# Patient Record
Sex: FEMALE | Race: OTHER | HISPANIC OR LATINO
[De-identification: names, ages, dates, MRNs, and addresses within clinical notes are randomized per-mention and may not be internally consistent; named-entity substitution may affect disease eponyms.]

---

## 2022-02-25 PROBLEM — Z00.00 ENCOUNTER FOR PREVENTIVE HEALTH EXAMINATION: Status: ACTIVE | Noted: 2022-02-25

## 2022-02-28 ENCOUNTER — APPOINTMENT (OUTPATIENT)
Dept: OBGYN | Facility: CLINIC | Age: 72
End: 2022-02-28
Payer: MEDICARE

## 2022-02-28 VITALS
HEART RATE: 88 BPM | OXYGEN SATURATION: 97 % | HEIGHT: 59 IN | SYSTOLIC BLOOD PRESSURE: 152 MMHG | WEIGHT: 193 LBS | DIASTOLIC BLOOD PRESSURE: 91 MMHG | BODY MASS INDEX: 38.91 KG/M2

## 2022-02-28 PROCEDURE — 99387 INIT PM E/M NEW PAT 65+ YRS: CPT

## 2022-02-28 NOTE — PHYSICAL EXAM
[Examination Of The Breasts] : a normal appearance [No Masses] : no breast masses were palpable [Labia Majora] : normal [Labia Minora] : normal [Normal] : normal [Atrophy] : atrophy [Rectocele] : a rectocele [Absent] : absent [Uterine Adnexae] : normal

## 2022-02-28 NOTE — HISTORY OF PRESENT ILLNESS
[FreeTextEntry1] : here for annual\par no complaints \par mammo 2021 neg per patient\par menopausal \par hx JUAN RAMON BS for AUB-L \par

## 2023-06-29 ENCOUNTER — APPOINTMENT (OUTPATIENT)
Dept: SURGERY | Facility: CLINIC | Age: 73
End: 2023-06-29
Payer: MEDICARE

## 2023-06-29 VITALS
HEIGHT: 59 IN | BODY MASS INDEX: 38.3 KG/M2 | WEIGHT: 190 LBS | SYSTOLIC BLOOD PRESSURE: 148 MMHG | DIASTOLIC BLOOD PRESSURE: 89 MMHG

## 2023-06-29 DIAGNOSIS — Z80.3 FAMILY HISTORY OF MALIGNANT NEOPLASM OF BREAST: ICD-10-CM

## 2023-06-29 DIAGNOSIS — Z82.49 FAMILY HISTORY OF ISCHEMIC HEART DISEASE AND OTHER DISEASES OF THE CIRCULATORY SYSTEM: ICD-10-CM

## 2023-06-29 DIAGNOSIS — Z83.3 FAMILY HISTORY OF DIABETES MELLITUS: ICD-10-CM

## 2023-06-29 DIAGNOSIS — I10 ESSENTIAL (PRIMARY) HYPERTENSION: ICD-10-CM

## 2023-06-29 DIAGNOSIS — Z84.89 FAMILY HISTORY OF OTHER SPECIFIED CONDITIONS: ICD-10-CM

## 2023-06-29 DIAGNOSIS — H26.9 UNSPECIFIED CATARACT: ICD-10-CM

## 2023-06-29 PROCEDURE — 99203 OFFICE O/P NEW LOW 30 MIN: CPT

## 2023-06-29 NOTE — PHYSICAL EXAM
[Alert] : alert [Oriented to Person] : oriented to person [Oriented to Place] : oriented to place [Oriented to Time] : oriented to time [Calm] : calm [de-identified] : She  is alert, well-groomed, and in NAD\par   [de-identified] : anicteric.  Nasal mucosa pink, septum midline. Oral mucosa pink.  Tongue midline, Pharynx without exudates.\par   [de-identified] : Neck supple. Trachea midline. Thyroid isthmus barely palpable, lobes not felt.\par   [de-identified] : BL inverted nipple for many years.  induration right breast at biopsy site. No chest deformity. Breast are symmetric, Normal contours. No nodules, masses, tenderness, or axillary or supraclavicular  adenopathy. No nipple discharge. no skin retraction

## 2023-06-29 NOTE — PLAN
[FreeTextEntry1] : Ms. BRADFORD  was told significance of findings, options, risks and benefits were explained.  Informed consent for excision right breast mass with spot localization and potential risks, benefits and alternatives (surgical options were discussed including non-surgical options or the option of no surgery) to the planned surgery were discussed in depth.  All surgical options were discussed including non-surgical treatments.  She wishes to proceed with surgery.  We will plan for surgery on at the next available date, pending any required insurance pre-certification or pre-approval. She agrees to obtain any necessary pre-operative evaluations and testing prior to surgery.\par Patient advised to seek immediate medical attention with any acute change in symptoms or with the development of any new or worsening symptoms.  Patient's questions and concerns addressed to patient's satisfaction, and patient verbalized an understanding of the information discussed.\par \par

## 2023-06-29 NOTE — HISTORY OF PRESENT ILLNESS
[de-identified] : Patient is a 72 year   -old female  who was referred by Dr. Mele Cota with the chief complaint of having a Right  breast mass. She  denies any trauma and She has no nipple discharge. She  denies any fever, night sweats or loss of appetite.    There is  family history of breast carcinoma in her mother in her 60s and her daughter at age 48 ( she had chemo and RT.  genetic testing was negative).    Menarche at age 12 -7 para-7  and her last menstrual period was before her hysterectomy at age 49. Patient is on no hormonal replacement therapy.    Patient  had a BL breast US and a  mammogram on 05/15/2023 that was deemed a BIRADS 0. \par She had a targeted right breast mammogram  on 2023 that  was deemed a BIRADS4. \par A  stereotactic biopsy right breast was done on 2023  .  Pathology results were consistent with  Breast tissue with fibrocystic changes with calcium oxalate crystals. Hemangioma. Incidental small intraductal papilloma. concordance is not documented

## 2023-06-29 NOTE — DATA REVIEWED
[FreeTextEntry1] : 	\par Exam requested by:\par DR ROCK JORDAN MD\par [address]\par [place] [address]\par SITE PERFORMED: [facility]\par SITE PHONE: [phone]\par Patient: ARCHIE BRADFORD\par YOB: 1950\par Phone: [phone]\par MRN: [id number] Acc: [id number]\par Date of Exam: 06-\par  \par Addendum 1 - 06-\par  \par BREAST SURGICAL PATHOLOGY ADDENDUM:\par \par Right central/retroareolar calcifications: Breast tissue with fibrocystic changes with calcium oxalate crystals. Hemangioma. Incidental small intraductal papilloma.\par \par Please see official pathology report for details.\par \par Given the presence of incidental small intraductal papilloma consultation with a surgeon is recommended for further management.\par \par A right mammogram with magnification views is also recommended in 6 months.\par \par Thank you for the opportunity to participate in the care of this patient.  \par  \par Radha Bustillo MD  - Electronically Signed: 06- 2:30 PM \par Physician to Physician Direct Line is: [phone]\par Original Report\par EXAM: STEREOTACTIC BIOPSY 1 SITE\par \par HISTORY: Stereotactic biopsy of right breast calcifications.\par \par COMPARISON: Prior breast imaging studies dated 6/5/2023\par \par PROCEDURE: The procedure was explained to the patient, including benefits and alternatives. The risks, including but not limited to infection and bleeding, were reviewed. All questions were answered, and the patient agreed to the procedure, signing the consent form. Universal timeout was performed.\par \par A  film and subsequent prefire tomosynthesis imaging shows the suspicious calcifications within the operating window. \par \par Using sterile technique, 5 mL of 1% lidocaine and 10 mL of 1% lidocaine with epinephrine was administered. Biopsy was performed using a 9-gauge EVIVA vacuum-assisted breast biopsy standard needle utilizing a lateral-medial approach. Confirmatory pre--fire images demonstrate adequate placement of the biopsy needle. The usual number of core samples was obtained. A specimen radiograph confirms the presence of calcifications.\par \par A cork shaped Eviva biopsy clip was deployed at the biopsy site. \par \par A postprocedure mammogram demonstrates appropriate placement of the clip. \par \par The patient tolerated the procedure well without complications. The patient was given postbiopsy care instructions. The specimen was subsequently sent to the pathology lab.\par \par IMPRESSION: 1 site stereotactic biopsy was performed. \par \par Pathology results and concordance will follow as an addendum to this report. \par \par Thank you for the opportunity to participate in the care of this patient.  \par  \par Radha Bustillo MD  - Electronically Signed: 06- 3:39 PM \par Physician to Physician Direct Line is: [phone]\par

## 2023-06-29 NOTE — CONSULT LETTER
[Dear  ___] : Dear  [unfilled], [Consult Letter:] : I had the pleasure of evaluating your patient, [unfilled]. [Please see my note below.] : Please see my note below. [Consult Closing:] : Thank you very much for allowing me to participate in the care of this patient.  If you have any questions, please do not hesitate to contact me. [Sincerely,] : Sincerely, [FreeTextEntry3] : Cayetano Hobbs MD, FACS

## 2023-08-02 ENCOUNTER — OUTPATIENT (OUTPATIENT)
Dept: OUTPATIENT SERVICES | Facility: HOSPITAL | Age: 73
LOS: 1 days | End: 2023-08-02

## 2023-08-02 VITALS
DIASTOLIC BLOOD PRESSURE: 89 MMHG | HEART RATE: 62 BPM | WEIGHT: 190.04 LBS | RESPIRATION RATE: 16 BRPM | TEMPERATURE: 98 F | SYSTOLIC BLOOD PRESSURE: 160 MMHG | OXYGEN SATURATION: 98 % | HEIGHT: 59 IN

## 2023-08-02 DIAGNOSIS — Z90.710 ACQUIRED ABSENCE OF BOTH CERVIX AND UTERUS: Chronic | ICD-10-CM

## 2023-08-02 DIAGNOSIS — N63.10 UNSPECIFIED LUMP IN THE RIGHT BREAST, UNSPECIFIED QUADRANT: ICD-10-CM

## 2023-08-02 DIAGNOSIS — Z98.49 CATARACT EXTRACTION STATUS, UNSPECIFIED EYE: Chronic | ICD-10-CM

## 2023-08-02 DIAGNOSIS — I10 ESSENTIAL (PRIMARY) HYPERTENSION: ICD-10-CM

## 2023-08-02 DIAGNOSIS — Z01.818 ENCOUNTER FOR OTHER PREPROCEDURAL EXAMINATION: ICD-10-CM

## 2023-08-02 RX ORDER — SODIUM CHLORIDE 9 MG/ML
3 INJECTION INTRAMUSCULAR; INTRAVENOUS; SUBCUTANEOUS EVERY 8 HOURS
Refills: 0 | Status: DISCONTINUED | OUTPATIENT
Start: 2023-08-09 | End: 2023-08-23

## 2023-08-02 NOTE — H&P PST ADULT - PROBLEM SELECTOR PLAN 2
Pt instructed  to continue all antihypertensives as prescribed including taking on day of surgery with small sip of water.

## 2023-08-02 NOTE — H&P PST ADULT - HISTORY OF PRESENT ILLNESS
72 y.o female with, PMHx for HTN, Hysterectomy 2008, cataract surgery. Found to have Right Breast Mass   on routine Mammogram 5/2023, s/p Right Breast Biopsy revealed incidental findings of Intraductal Papilloma.   now presents for presurgical evaluation for schedule Excision of Right Breast Mass with Pre-Operative Needle Localization Rad Time

## 2023-08-02 NOTE — H&P PST ADULT - NSICDXFAMILYHX_GEN_ALL_CORE_FT
FAMILY HISTORY:  Mother  Still living? Unknown  FH: breast cancer, Age at diagnosis: Age Unknown    Child  Still living? Yes, Estimated age: Age Unknown  FH: breast cancer, Age at diagnosis: Age Unknown

## 2023-08-02 NOTE — H&P PST ADULT - ASSESSMENT
72 y.o female with, Unspecified Lump in the Right Breast and now for schedule Excision of Right Breast Mass with Pre-Operative   Needle Localization Rad Time on 8/9/23 with Dr Anjel DONOHUE 3

## 2023-08-02 NOTE — H&P PST ADULT - NSICDXPROCEDURE_GEN_ALL_CORE_FT
PROCEDURES:  Excision, lesion, breast, with needle localization 02-Aug-2023 15:38:37  Rachele Ontiveros

## 2023-08-02 NOTE — H&P PST ADULT - PROBLEM SELECTOR PLAN 1
Pt schedule for Excision of Right Breast Mass with Pre-Operative Needle Localization Rad Time on 8/9/23 with Dr Hobbs     Labs and EKG done  by PCP 7/26/23- will f/u result  Pt was seen by PCP for Medical clearance - will f/u report    Pt was  instructed to stop aspirin/ecotrin and all over the counter medication including vitamins and herbal supplements one week prior to surgery   Instructions given on the use of 4% chlorhexidine wash and Pt verbalized understanding of same   Pt Instructed to have nothing by mouth starting midnight day before surgery  Patient is to expect a phone call day before surgery between the hours of 430- 630pm giving arrival time for surgery   Written and verbal preoperative instructions given to patient with understanding verbalized via # [id number]  Shadi    Patient today with STOP bang score 3  Low  risk for ALAN

## 2023-08-08 ENCOUNTER — TRANSCRIPTION ENCOUNTER (OUTPATIENT)
Age: 73
End: 2023-08-08

## 2023-08-09 ENCOUNTER — TRANSCRIPTION ENCOUNTER (OUTPATIENT)
Age: 73
End: 2023-08-09

## 2023-08-09 ENCOUNTER — RESULT REVIEW (OUTPATIENT)
Age: 73
End: 2023-08-09

## 2023-08-09 ENCOUNTER — OUTPATIENT (OUTPATIENT)
Dept: OUTPATIENT SERVICES | Facility: HOSPITAL | Age: 73
LOS: 1 days | End: 2023-08-09
Payer: COMMERCIAL

## 2023-08-09 ENCOUNTER — APPOINTMENT (OUTPATIENT)
Dept: SURGERY | Facility: HOSPITAL | Age: 73
End: 2023-08-09
Payer: MEDICARE

## 2023-08-09 VITALS
TEMPERATURE: 98 F | OXYGEN SATURATION: 100 % | HEART RATE: 68 BPM | SYSTOLIC BLOOD PRESSURE: 127 MMHG | RESPIRATION RATE: 16 BRPM | DIASTOLIC BLOOD PRESSURE: 65 MMHG

## 2023-08-09 VITALS
HEART RATE: 61 BPM | WEIGHT: 190.04 LBS | RESPIRATION RATE: 16 BRPM | TEMPERATURE: 98 F | HEIGHT: 59 IN | OXYGEN SATURATION: 95 % | DIASTOLIC BLOOD PRESSURE: 84 MMHG | SYSTOLIC BLOOD PRESSURE: 164 MMHG

## 2023-08-09 DIAGNOSIS — Z90.710 ACQUIRED ABSENCE OF BOTH CERVIX AND UTERUS: Chronic | ICD-10-CM

## 2023-08-09 DIAGNOSIS — Z98.49 CATARACT EXTRACTION STATUS, UNSPECIFIED EYE: Chronic | ICD-10-CM

## 2023-08-09 DIAGNOSIS — N63.10 UNSPECIFIED LUMP IN THE RIGHT BREAST, UNSPECIFIED QUADRANT: ICD-10-CM

## 2023-08-09 PROCEDURE — 76098 X-RAY EXAM SURGICAL SPECIMEN: CPT

## 2023-08-09 PROCEDURE — 76098 X-RAY EXAM SURGICAL SPECIMEN: CPT | Mod: 26

## 2023-08-09 PROCEDURE — 19125 EXCISION BREAST LESION: CPT | Mod: RT

## 2023-08-09 PROCEDURE — 19281 PERQ DEVICE BREAST 1ST IMAG: CPT

## 2023-08-09 PROCEDURE — 88307 TISSUE EXAM BY PATHOLOGIST: CPT

## 2023-08-09 PROCEDURE — 19281 PERQ DEVICE BREAST 1ST IMAG: CPT | Mod: RT

## 2023-08-09 PROCEDURE — 88307 TISSUE EXAM BY PATHOLOGIST: CPT | Mod: 26

## 2023-08-09 RX ORDER — SODIUM CHLORIDE 9 MG/ML
1000 INJECTION, SOLUTION INTRAVENOUS
Refills: 0 | Status: DISCONTINUED | OUTPATIENT
Start: 2023-08-09 | End: 2023-08-09

## 2023-08-09 RX ORDER — ONDANSETRON 8 MG/1
4 TABLET, FILM COATED ORAL ONCE
Refills: 0 | Status: DISCONTINUED | OUTPATIENT
Start: 2023-08-09 | End: 2023-08-09

## 2023-08-09 RX ORDER — HYDROMORPHONE HYDROCHLORIDE 2 MG/ML
0.5 INJECTION INTRAMUSCULAR; INTRAVENOUS; SUBCUTANEOUS
Refills: 0 | Status: DISCONTINUED | OUTPATIENT
Start: 2023-08-09 | End: 2023-08-09

## 2023-08-09 RX ORDER — HYDROMORPHONE HYDROCHLORIDE 2 MG/ML
1 INJECTION INTRAMUSCULAR; INTRAVENOUS; SUBCUTANEOUS
Refills: 0 | Status: DISCONTINUED | OUTPATIENT
Start: 2023-08-09 | End: 2023-08-09

## 2023-08-09 RX ADMIN — SODIUM CHLORIDE 3 MILLILITER(S): 9 INJECTION INTRAMUSCULAR; INTRAVENOUS; SUBCUTANEOUS at 07:52

## 2023-08-09 NOTE — ASU DISCHARGE PLAN (ADULT/PEDIATRIC) - CARE PROVIDER_API CALL
Cayetano Hobbs  Surgery  [address]  [place] [address]  Phone: [phone]  Fax: [phone]  Follow Up Time: 2 weeks

## 2023-08-09 NOTE — BRIEF OPERATIVE NOTE - NSICDXBRIEFPROCEDURE_GEN_ALL_CORE_FT
PROCEDURES:  Excision, mass, breast, with needle localization 09-Aug-2023 11:39:39 right Ana Maria Dominguez

## 2023-08-09 NOTE — ASU DISCHARGE PLAN (ADULT/PEDIATRIC) - PATIENT BELONGINGS
"Requested Prescriptions   Pending Prescriptions Disp Refills     acyclovir (ZOVIRAX) 400 MG tablet [Pharmacy Med Name: ACYCLOVIR 400 MG TABLET] 21 tablet      Sig: Take 1 tablet (400 mg) by mouth 3 times daily Uses only for outbreaks    Antivirals for Herpes Protocol Failed    1/30/2018  5:11 PM       Failed - Recent or future visit with authorizing provider's specialty    Patient had office visit in the last year or has a visit in the next 30 days with authorizing provider.  See \"Patient Info\" tab in inbasket, or \"Choose Columns\" in Meds & Orders section of the refill encounter.     Last Written Prescription Date:  9/15/17  Last Fill Quantity: 21,  # refills: 0   Last Office Visit with [facility] provider:  3/22/16   Future Office Visit:    Next 5 appointments (look out 90 days)     Feb 06, 2018  3:40 PM CST   [facility] Jennifer [facility]-C   [facility]    [address] [facility] [address]   [phone]            Mar 06, 2018  3:40 PM CST   Return Visit with Jennifer Hinton PA-C   [facility]    [address] [facility] [address]   [phone]                            Passed - Patient is age 12 or older       Passed - Normal serum creatinine on file in past 12 months    Recent Labs   Lab Test  01/09/18   1517   CR  0.65               " Patient's belongings returned

## 2023-08-09 NOTE — ASU PATIENT PROFILE, ADULT - PATIENT/CAREGIVER ACCEPTED INTERPRETER SERVICES
COLPOSCOPY PROCEDURE:  2022    Site: Colposcopy of the cervix and upper vagina    23: ASC-H pap smear  Age 23: NILM (per patient report)  Age 22: ASCUS (per patient report)    The colposcopy procedure was explained in detail. Benefits, alternatives including doing nothing, and risks including pain, bleeding, infection, uterine perforation, damage to surrounding organs, possible disruption of pregnancy, possibly missing abnormal tissue and need to repeat procedure were reviewed. Patient's written informed consent for procedure and time out was performed. Urine pregnancy test negative. Speculum inserted. Cervix examined.  No abnormal vessels were noted.  The cervix was then liberally coated with acetic acid solution and examined using the colposcope at multiple gilbert and with the white filter lights.  The entire transformation zone was visualized.  She had coarse mosacism in the setting of acetowhite epithelium extended from 9 o'clock to 3 o'clock on the anterior cervix. All edges of the lesion were visualized, borders were geographic.  Biopsies were obtained from 3 o'clock and 11 o'clock.  A small amount of bleeding was noted at the biopsy sites and was controlled with Monsels solution and silver nitrate.  Excellent hemostasis noted.  All instruments were removed.  Sponge and instrument counts correct X 2. Complications: none.     ASSESSMENT:  Mirta Bautista 24 y.o.  presents for colposcopy secondary to ASC-H pap.  Patient desires to undergo colposcopy today.     We discussed the classification of abnormal pap smears as well as ROGER in detail. Etiology of ROGER 2, 3 secondary to HPV discussed with patient, normally due to subtypes 16, 18. We discussed HPV in detail. We discussed how it is transmitted, how it causes cervical dysplasia, and risk factors for persistence and progression. The procedure was discussed with the patient and she is aware that she might experience some post procedural  "cramping.  She is aware that she may take Motrin as needed for pain.       Discussed etiology of HPV, that it is sexually transmitted and approximately 80% of individuals have been exposed to it at some point in their lives. Discussed that it is called the "common cold" of the cervix. Smoking and age affect the body's ability to clear this virus.   Discussed that the % chance of high grade lesions for ASCUS is 10-15%, ASC-H 40%, LSIL 25%, HSIL 60%. It takes approximately 7-10 years for these changes to progress to cancer.     IMPRESSION: ROGER 1-2    PLAN:  Office will call patient with results and follow up will be made as appropriate per ASCCP guidelines.   Motrin as need for pain.  Pt was educated regarding the possibility of spotting or brown or yellow discharge post procedure.  She was told to put nothing in the vagina for 7 days, including tampons, and to abstain from intercourse for the next 7 days.  She is aware that she should call the office immediately for any heavy bleeding, fevers, pain not controlled by Motrin or any other concerns    Alessandro Henao  8/4/2022    " yes

## 2023-08-09 NOTE — ASU DISCHARGE PLAN (ADULT/PEDIATRIC) - PROCEDURE
Excision of right reast mass with preop needle localization Excision of right breast mass with preop needle localization

## 2023-08-11 LAB — SURGICAL PATHOLOGY STUDY: SIGNIFICANT CHANGE UP

## 2023-08-16 ENCOUNTER — NON-APPOINTMENT (OUTPATIENT)
Age: 73
End: 2023-08-16

## 2023-08-16 PROBLEM — I10 ESSENTIAL (PRIMARY) HYPERTENSION: Chronic | Status: ACTIVE | Noted: 2023-08-02

## 2023-08-16 PROBLEM — N63.10 UNSPECIFIED LUMP IN THE RIGHT BREAST, UNSPECIFIED QUADRANT: Chronic | Status: ACTIVE | Noted: 2023-08-02

## 2023-08-24 ENCOUNTER — APPOINTMENT (OUTPATIENT)
Age: 73
End: 2023-08-24
Payer: MEDICARE

## 2023-08-24 PROCEDURE — 99024 POSTOP FOLLOW-UP VISIT: CPT

## 2023-08-24 NOTE — PHYSICAL EXAM
[Alert] : alert [Oriented to Person] : oriented to person [Oriented to Place] : oriented to place [Oriented to Time] : oriented to time [Calm] : calm [de-identified] : She  is alert, well-groomed, and in NAD\par    [de-identified] : anicteric.  Nasal mucosa pink, septum midline. Oral mucosa pink.  Tongue midline, Pharynx without exudates.\par    [de-identified] : Neck supple. Trachea midline. Thyroid isthmus barely palpable, lobes not felt.\par    [de-identified] : right breast Surgical wound is healing well.   no signs of  inflammation or infection.

## 2023-08-24 NOTE — HISTORY OF PRESENT ILLNESS
[de-identified] : Ms. BRADFORD  is s/p excision right breast mass with spot localization on 2023. Patient's pathology results were  consistent with Fibrocystic changes, proliferative type, without atypia, including usual duct epithelial hyperplasia and small duct papillomas. intraductal calcification associated with hyperplasia. Today  Ms. BRADFORD offers no complaints. patient reports no fever or  chills. patient reports occasional discomfort in the surgical area.  Her surgical incisions are healing well. No signs of inflammation, infection or exudate. patient reports good bowel movements and appetite.  PERTINENT HISTORY:  There is family history of breast carcinoma in her mother in her 60s and her daughter at age 48 ( she had chemo and RT. genetic testing was negative). Menarche at age 12 -7 para-7 and her last menstrual period was before her hysterectomy at age 49.   Patient had a BL breast US and a mammogram on 05/15/2023 that was deemed a BIRADS 0.She had a targeted right breast mammogram on 2023 that was deemed a BIRADS4. A stereotactic biopsy right breast was done on 2023. Pathology results were consistent with Breast tissue with fibrocystic changes with calcium oxalate crystals. Hemangioma. Incidental small intraductal papilloma. concordance is not documented

## 2023-08-24 NOTE — DATA REVIEWED
[FreeTextEntry1] : Rose[facility] Accession Number : [id number] Patient:   ARCHIE BRADFORD   Accession:                             [id number]  Collected Date/Time:                   8/9/2023 11:27 EDT Received Date/Time:                    8/9/2023 11:34 EDT  Surgical Pathology Report - Auth (Verified)  Specimen(s) Submitted Right breast mass  Final Diagnosis Breast, right, mass; wire-localization excision: - Fibrocystic changes, proliferative type, without atypia, including usual duct epithelial hyperplasia and small duct papillomas. - Intraductal calcification associated with hyperplasia.  Verified by: CLEMENTINE CORDERO M.D. (Electronic Signature) Reported on: 08/11/23 17:48 EDT, [address] Phone: [phone]   Fax: [phone] _________________________________________________________________  Clinical Information  Right breast mass

## 2023-08-24 NOTE — ASSESSMENT
[FreeTextEntry1] : Ms. BRADFORD is doing well, with excellent post-operative recovery. The surgical incision is healing well and as expected. There is no evidence of infection or complication, and patient is progressing as expected. Post-operative wound care, activity, restrictions and precautions reinforced.  Pathology results were discussed in details. Patient's questions and concerns addressed to patient's satisfaction.

## 2023-11-20 ENCOUNTER — NON-APPOINTMENT (OUTPATIENT)
Age: 73
End: 2023-11-20

## 2023-11-30 ENCOUNTER — APPOINTMENT (OUTPATIENT)
Dept: SURGERY | Facility: CLINIC | Age: 73
End: 2023-11-30

## 2023-12-14 ENCOUNTER — APPOINTMENT (OUTPATIENT)
Dept: SURGERY | Facility: CLINIC | Age: 73
End: 2023-12-14
Payer: MEDICARE

## 2023-12-14 VITALS
SYSTOLIC BLOOD PRESSURE: 126 MMHG | DIASTOLIC BLOOD PRESSURE: 76 MMHG | HEART RATE: 70 BPM | WEIGHT: 190 LBS | HEIGHT: 59 IN | BODY MASS INDEX: 38.3 KG/M2

## 2023-12-14 PROCEDURE — 99213 OFFICE O/P EST LOW 20 MIN: CPT

## 2023-12-14 NOTE — PLAN
[FreeTextEntry1] : Ms. BRADFORD  is presenting  today for an evaluation .  she is doing well and offers no complaints.     She  was advised to have   right     breast Mammogram and return after the test in 3 months. . Importance of monthly self-breast examination was reinforced.  Patient's questions and concerns addressed to patient's satisfaction.

## 2023-12-14 NOTE — PHYSICAL EXAM
[Alert] : alert [Oriented to Person] : oriented to person [Oriented to Place] : oriented to place [Oriented to Time] : oriented to time [Calm] : calm [de-identified] : She  is alert, well-groomed, and in NAD\par    [de-identified] : anicteric.  Nasal mucosa pink, septum midline. Oral mucosa pink.  Tongue midline, Pharynx without exudates.\par    [de-identified] : Neck supple. Trachea midline. Thyroid isthmus barely palpable, lobes not felt.\par    [de-identified] : right breast Surgical wound healed well.    Breast are symmetric,  No palpable nodules, masses, tenderness, or axillary or supraclavicular adenopathy. No nipple discharge. no skin retraction

## 2023-12-14 NOTE — HISTORY OF PRESENT ILLNESS
[de-identified] : This is  a 73 year   old patient presenting today for a breast exam .  Ms. BRADFORD  is s/p excision right breast mass with spot localization on 2023. Patient's pathology results were  consistent with Fibrocystic changes, proliferative type, without atypia, including usual duct epithelial hyperplasia and small duct papillomas. intraductal calcification associated with hyperplasia. Patient reports no interval changes to her overall health status or medical history   Ms. BRADFORD denies any trauma to the breast, denies  skin changes  and   she has no spontaneous nipple discharge. Patient denies any fever, night sweats or loss of appetite.    she has a prescription for Right breast mammo from Dr Cota in January.  PERTINENT HISTORY:  There is family history of breast carcinoma in her mother in her 60s and her daughter at age 48 ( she had chemo and RT. genetic testing was negative). Menarche at age 12 -7 para-7 and her last menstrual period was before her hysterectomy at age 49.   Patient had a BL breast US and a mammogram on 05/15/2023 that was deemed a BIRADS 0.She had a targeted right breast mammogram on 2023 that was deemed a BIRADS4. A stereotactic biopsy right breast was done on 2023. Pathology results were consistent with Breast tissue with fibrocystic changes with calcium oxalate crystals. Hemangioma. Incidental small intraductal papilloma. concordance is not documented

## 2023-12-14 NOTE — ASSESSMENT
[FreeTextEntry1] : Impression:  right breast mass  Ms. BRADFORD is doing well, with excellent post-operative recovery. All surgical incisions  healed well and as expected. There is no evidence of  complication or recurrence, and she is progressing as expected.   Patient's questions and concerns addressed to patient's satisfaction.

## 2024-03-04 ENCOUNTER — APPOINTMENT (OUTPATIENT)
Dept: OBGYN | Facility: CLINIC | Age: 74
End: 2024-03-04
Payer: MEDICARE

## 2024-03-04 VITALS
OXYGEN SATURATION: 98 % | BODY MASS INDEX: 38.98 KG/M2 | WEIGHT: 193 LBS | HEART RATE: 89 BPM | DIASTOLIC BLOOD PRESSURE: 77 MMHG | SYSTOLIC BLOOD PRESSURE: 134 MMHG

## 2024-03-04 DIAGNOSIS — Z01.419 ENCOUNTER FOR GYNECOLOGICAL EXAMINATION (GENERAL) (ROUTINE) W/OUT ABNORMAL FINDINGS: ICD-10-CM

## 2024-03-04 PROCEDURE — 99397 PER PM REEVAL EST PAT 65+ YR: CPT

## 2024-03-05 PROBLEM — Z01.419 WOMEN'S ANNUAL ROUTINE GYNECOLOGICAL EXAMINATION: Status: ACTIVE | Noted: 2022-02-28

## 2024-03-07 ENCOUNTER — APPOINTMENT (OUTPATIENT)
Dept: SURGERY | Facility: CLINIC | Age: 74
End: 2024-03-07
Payer: MEDICARE

## 2024-03-07 VITALS
SYSTOLIC BLOOD PRESSURE: 152 MMHG | WEIGHT: 192 LBS | HEIGHT: 59 IN | BODY MASS INDEX: 38.71 KG/M2 | DIASTOLIC BLOOD PRESSURE: 78 MMHG | HEART RATE: 67 BPM | OXYGEN SATURATION: 96 %

## 2024-03-07 PROCEDURE — 99213 OFFICE O/P EST LOW 20 MIN: CPT

## 2024-03-07 NOTE — DATA REVIEWED
[FreeTextEntry1] : [facility] [address] [facility] [address] S[facility] PERFORMED: [facility] Patient: ARCHIE BRADFORD YOB: 1950 Phone: [phone] MRN: [id number] Acc: [id number] Date of Exam: 01-   Addendum 1 - 01-   The pathology results from the stereotactic core needle biopsy of the right breast performed 1/23/2024 reveal columnar cell change with associated calcifications. Dilated ducts with prominent infoldings. A portion of dilated ducts with prominent frond-like infoldings is noted in one of the cores. This may cause partial luminal obstruction with a subsequent duct dilation. Intraductal papilloma cannot entirely be excluded. Surgical consultation is recommended. Attempt was made to contact the patient at 12:57 PM on 1/29/2024. Attempt was also made to contact the ordering provider at 1:00 PM on 1/29/2024. Critical results pathway initiated.  Thank you for the opportunity to participate in the care of this patient.     KENDRA RAMÍREZ MD  - Electronically Signed: 01- 3:20 PM  Physician to Physician Direct Line is:  Original Report EXAM: STEREOTACTIC BIOPSY 1 SITE  HISTORY: The patient is 73 years old and was referred for a stereotactic biopsy of calcifications at the right 3 o'clock central axis.  COMPARISON: Prior breast imaging studies dated 1/9/2024, 6/12/2023, 6/5/2023.  PROCEDURE: The procedure was explained to the patient, including benefits and alternatives. The risks, including but not limited to infection and bleeding, were reviewed. All questions were answered, and the patient agreed to the procedure, signing the consent form. Universal timeout was performed.  A  film and subsequent prefire tomosynthesis imaging shows the suspicious calcifications within the operating window.   Using sterile technique, 8 mL of 2% lidocaine buffered with sodium bicarbonate solution was used for local and superficial anesthesia and 10 mL of 2% lidocaine with epinephrine buffered with sodium bicarbonate solution was administered to anesthetize the deeper tissues. A skin incision was made prior to insertion of the biopsy needle. Biopsy was performed using a 9-gauge EVIVA vacuum-assisted breast biopsy standard needle utilizing a medial-lateral approach. Confirmatory pre-and post-fire images demonstrate adequate placement of the biopsy needle. The usual number of core samples was obtained. A specimen radiograph confirms the presence of calcifications.  A cork shaped Eviva biopsy clip was deployed at the biopsy site. Residual calcifications were present post core biopsy.  A postprocedure mammogram demonstrates appropriate placement of the clip.   The patient tolerated the procedure well without complications. The patient was given postbiopsy care instructions. The specimen was subsequently sent to the pathology lab.  IMPRESSION: 1 site stereotactic biopsy was performed.   Pathology results and concordance will follow as an addendum to this report.   Thank you for the opportunity to participate in the care of this patient.     KENDRA RAMÍREZ MD  - Electronically Signed: 01- 1:27 PM  Physician to Physician Direct Line is:

## 2024-03-07 NOTE — PHYSICAL EXAM
[Oriented to Person] : oriented to person [Alert] : alert [Oriented to Place] : oriented to place [Oriented to Time] : oriented to time [Calm] : calm [de-identified] : She  is alert, well-groomed, and in NAD\par    [de-identified] : anicteric.  Nasal mucosa pink, septum midline. Oral mucosa pink.  Tongue midline, Pharynx without exudates.\par    [de-identified] : Neck supple. Trachea midline. Thyroid isthmus barely palpable, lobes not felt.\par    [de-identified] : right breast  well  healed scar.    Breast are symmetric,  No palpable nodules, masses, tenderness, or axillary or supraclavicular adenopathy. No nipple discharge. no skin retraction

## 2024-03-07 NOTE — HISTORY OF PRESENT ILLNESS
[de-identified] : This is  a 73 year   old patient presenting today for a breast exam and to discuss the results of her recent  breast imaging and biopsy.  Patient had a  right breast   Mammogram on 2024. .  Deemed BIRADS category  4.   Ms. BRADFORD  is s/p BL breast US on 2024  that was deemed BIRADS 2.  Ms. BRADFORD  is s/p Stereotactic biopsy right breast on 2024. Patient's pathology results were  consistent with columnar cell change with associated calcifications. Dilated ducts with prominent infoldings. A portion of dilated ducts with prominent frond-like infoldings is noted in one of the cores. This may cause partial luminal obstruction with a subsequent duct dilation. Intraductal papilloma cannot entirely be excluded. Surgical consultation is recommended. Patient reports no interval changes to her overall health status or medical history.   Ms. BRADFORD denies any trauma to the breast, denies  skin changes  and   she has no spontaneous nipple discharge. Patient denies any fever, night sweats or loss of appetite.     PERTINENT HISTORY: There is family history of breast carcinoma in her mother in her 60s and her daughter at age 48 ( she had chemo and RT. genetic testing was negative). Menarche at age 12 -7 para-7 and her last menstrual period was before her hysterectomy at age 49. Patient had a BL breast US and a mammogram on 05/15/2023 that was deemed a BIRADS 0.She had a targeted right breast mammogram on 2023 that was deemed a BIRADS4. A stereotactic biopsy right breast was done on 2023. Pathology results were consistent with Breast tissue with fibrocystic changes with calcium oxalate crystals. Hemangioma. Incidental small intraductal papilloma. concordance is not documented. Ms. BRADFORD is s/p excision right breast mass with spot localization on 2023. Patient's pathology results were consistent with Fibrocystic changes, proliferative type, without atypia, including usual duct epithelial hyperplasia and small duct papillomas. intraductal calcification associated with hyperplasia.

## 2024-03-07 NOTE — PLAN
[FreeTextEntry1] : Ms. BRADFORD  was told significance of findings, options, risks and benefits were explained.  Informed consent for excision right breast mass with spot localization , and potential risks, benefits and alternatives (surgical options were discussed including non-surgical options or the option of no surgery) to the planned surgery were discussed in depth.  All surgical options were discussed including non-surgical treatments.  She wishes to proceed with surgery.  We will plan for surgery on at the next available date, pending any required insurance pre-certification or pre-approval. She agrees to obtain any necessary pre-operative evaluations and testing prior to surgery. Patient advised to seek immediate medical attention with any acute change in symptoms or with the development of any new or worsening symptoms.  Patient's questions and concerns addressed to patient's satisfaction, and patient verbalized an understanding of the information discussed.

## 2024-03-14 ENCOUNTER — APPOINTMENT (OUTPATIENT)
Dept: SURGERY | Facility: CLINIC | Age: 74
End: 2024-03-14

## 2024-03-21 ENCOUNTER — OUTPATIENT (OUTPATIENT)
Dept: OUTPATIENT SERVICES | Facility: HOSPITAL | Age: 74
LOS: 1 days | End: 2024-03-21
Payer: COMMERCIAL

## 2024-03-21 VITALS
OXYGEN SATURATION: 97 % | SYSTOLIC BLOOD PRESSURE: 148 MMHG | HEIGHT: 59 IN | RESPIRATION RATE: 18 BRPM | WEIGHT: 190.04 LBS | TEMPERATURE: 98 F | DIASTOLIC BLOOD PRESSURE: 97 MMHG | HEART RATE: 62 BPM

## 2024-03-21 DIAGNOSIS — N18.9 CHRONIC KIDNEY DISEASE, UNSPECIFIED: ICD-10-CM

## 2024-03-21 DIAGNOSIS — J98.9 RESPIRATORY DISORDER, UNSPECIFIED: ICD-10-CM

## 2024-03-21 DIAGNOSIS — K76.0 FATTY (CHANGE OF) LIVER, NOT ELSEWHERE CLASSIFIED: ICD-10-CM

## 2024-03-21 DIAGNOSIS — I10 ESSENTIAL (PRIMARY) HYPERTENSION: ICD-10-CM

## 2024-03-21 DIAGNOSIS — Z01.818 ENCOUNTER FOR OTHER PREPROCEDURAL EXAMINATION: ICD-10-CM

## 2024-03-21 DIAGNOSIS — N63.10 UNSPECIFIED LUMP IN THE RIGHT BREAST, UNSPECIFIED QUADRANT: ICD-10-CM

## 2024-03-21 DIAGNOSIS — Z90.710 ACQUIRED ABSENCE OF BOTH CERVIX AND UTERUS: Chronic | ICD-10-CM

## 2024-03-21 DIAGNOSIS — Z98.49 CATARACT EXTRACTION STATUS, UNSPECIFIED EYE: Chronic | ICD-10-CM

## 2024-03-21 RX ORDER — AMLODIPINE BESYLATE 2.5 MG/1
1 TABLET ORAL
Refills: 0 | DISCHARGE

## 2024-03-21 NOTE — H&P PST ADULT - PROBLEM SELECTOR PLAN 2
Instructed to continue antihypertensive meds and take them with sips of water on day of surgery.  Optimized for surgery by PCP.  Follow-up with PCP for management.

## 2024-03-21 NOTE — H&P PST ADULT - RESPIRATORY AND THORAX COMMENTS
COVID-19 virus infection in 2021 x 2-fully recovered, COVID-19 virus infection in 2021 x 2-fully recovered, mild prominent interstitial markings as of CXR of 3/12/2024-being followed by Mele Cipriano

## 2024-03-21 NOTE — H&P PST ADULT - PROBLEM SELECTOR PLAN 1
Pt for excision of right breast mass with preop needle localization on 4/2/2024.  ALAN stop bang score 4. Pt denies history of ALAN, never did sleep study.  Preoperative instructions discussed with pt via Serbian speaking [employer]  Apryl ID# [id number]. [place] copy of instructions given to pt.   Instructed pt that he will need someone to escort him home after surgery, to notify security when he arrives in the lobby of the hospital that he is here for surgery, not to eat or drink anything after midnight the night before the surgery, to avoid NSAIDs such as Ibuprofen, Motrin, aleve, Advil, naproxen before surgery, to take Tylenol if needed for pain, to report if he has been exposed to any one with any contagious diseases including Covid-19 or if he is exhibiting any symptoms of COVID-19.  Instructed about use of Chlorhexidine 4% soap 3 days before surgery including the morning of surgery. Verbalized understanding of instructions given.

## 2024-03-21 NOTE — H&P PST ADULT - PROBLEM SELECTOR PLAN 3
mild prominent interstitial markings as per recent chest X-Ray of 3/12/24-followed by pulmonology Dr Cota.  Will obtain last visit note with pulmonology.

## 2024-03-21 NOTE — H&P PST ADULT - HISTORY OF PRESENT ILLNESS
This is a 73 year old female with PMH of COVID-19 virus infection in 2021 x 2-fully recovered, HTN, CKD, presents with c/o right breast mass. Pt for excision of right breast mass with preop needle localization on 4/2/2024. This is a 73 year old  female with PMH of COVID-19 virus infection in 2021 x 2-fully recovered, HTN, CKDLAST egfr 58 on 3/12/24-being followed by nephrology, nonalcoholic fatty liver, mild prominent interstitial markings as per recent chest X-Ray of 3/12/24-followed by pulmonology Dr Cota, who presents with c/o right breast mass. Pt for excision of right breast mass with preop needle localization on 4/2/2024.

## 2024-03-21 NOTE — H&P PST ADULT - PROBLEM SELECTOR PLAN 4
Last EGFR 58 on 3/12/24-being followed by nephrology.  NSAIDs to be avoided perioperatively.  Pt instructed to avoid NSAIDs 1 week before surgery.  Advised to take Tylenol as needed for pain.   Stated understanding of instructions given.

## 2024-03-21 NOTE — H&P PST ADULT - ASSESSMENT
This is a 73 year old  female with PMH of COVID-19 virus infection in 2021 x 2-fully recovered, HTN, CKD-last EGFR 58-being followed by nephrology, nonalcoholic fatty liver, mild prominent interstitial markings as per recent chest X-Ray of 3/12/24-followed by pulmonology Dr Cota, who presents with right breast mass. Pt for excision of right breast mass with preop needle localization on 4/2/2024.  ALAN stop bang score 4. Pt denies history of ALAN, never did sleep study.

## 2024-03-21 NOTE — H&P PST ADULT - NSICDXPROCEDURE_GEN_ALL_CORE_FT
PROCEDURES:  Excision of right breast mass with needle localization 21-Mar-2024 19:42:52  Fay Bradshaw

## 2024-03-22 PROCEDURE — G0463: CPT

## 2024-03-26 ENCOUNTER — TRANSCRIPTION ENCOUNTER (OUTPATIENT)
Age: 74
End: 2024-03-26

## 2024-03-27 ENCOUNTER — APPOINTMENT (OUTPATIENT)
Dept: SURGERY | Facility: HOSPITAL | Age: 74
End: 2024-03-27

## 2024-03-27 ENCOUNTER — RESULT REVIEW (OUTPATIENT)
Age: 74
End: 2024-03-27

## 2024-03-27 ENCOUNTER — TRANSCRIPTION ENCOUNTER (OUTPATIENT)
Age: 74
End: 2024-03-27

## 2024-03-27 ENCOUNTER — OUTPATIENT (OUTPATIENT)
Dept: OUTPATIENT SERVICES | Facility: HOSPITAL | Age: 74
LOS: 1 days | End: 2024-03-27
Payer: COMMERCIAL

## 2024-03-27 VITALS
TEMPERATURE: 98 F | WEIGHT: 190.04 LBS | SYSTOLIC BLOOD PRESSURE: 148 MMHG | HEART RATE: 58 BPM | OXYGEN SATURATION: 97 % | DIASTOLIC BLOOD PRESSURE: 84 MMHG | RESPIRATION RATE: 17 BRPM | HEIGHT: 59 IN

## 2024-03-27 VITALS
TEMPERATURE: 98 F | DIASTOLIC BLOOD PRESSURE: 79 MMHG | HEART RATE: 68 BPM | SYSTOLIC BLOOD PRESSURE: 155 MMHG | RESPIRATION RATE: 16 BRPM | OXYGEN SATURATION: 95 %

## 2024-03-27 DIAGNOSIS — N63.10 UNSPECIFIED LUMP IN THE RIGHT BREAST, UNSPECIFIED QUADRANT: ICD-10-CM

## 2024-03-27 DIAGNOSIS — Z98.49 CATARACT EXTRACTION STATUS, UNSPECIFIED EYE: Chronic | ICD-10-CM

## 2024-03-27 DIAGNOSIS — Z90.710 ACQUIRED ABSENCE OF BOTH CERVIX AND UTERUS: Chronic | ICD-10-CM

## 2024-03-27 DIAGNOSIS — Z01.818 ENCOUNTER FOR OTHER PREPROCEDURAL EXAMINATION: ICD-10-CM

## 2024-03-27 PROCEDURE — 88342 IMHCHEM/IMCYTCHM 1ST ANTB: CPT

## 2024-03-27 PROCEDURE — 19281 PERQ DEVICE BREAST 1ST IMAG: CPT | Mod: RT

## 2024-03-27 PROCEDURE — 88305 TISSUE EXAM BY PATHOLOGIST: CPT | Mod: 26

## 2024-03-27 PROCEDURE — 88341 IMHCHEM/IMCYTCHM EA ADD ANTB: CPT

## 2024-03-27 PROCEDURE — 88341 IMHCHEM/IMCYTCHM EA ADD ANTB: CPT | Mod: 26

## 2024-03-27 PROCEDURE — 19125 EXCISION BREAST LESION: CPT | Mod: RT

## 2024-03-27 PROCEDURE — 88305 TISSUE EXAM BY PATHOLOGIST: CPT

## 2024-03-27 PROCEDURE — 19281 PERQ DEVICE BREAST 1ST IMAG: CPT

## 2024-03-27 PROCEDURE — 88342 IMHCHEM/IMCYTCHM 1ST ANTB: CPT | Mod: 26

## 2024-03-27 PROCEDURE — 76098 X-RAY EXAM SURGICAL SPECIMEN: CPT

## 2024-03-27 PROCEDURE — 76098 X-RAY EXAM SURGICAL SPECIMEN: CPT | Mod: 26

## 2024-03-27 RX ORDER — SODIUM CHLORIDE 9 MG/ML
3 INJECTION INTRAMUSCULAR; INTRAVENOUS; SUBCUTANEOUS EVERY 8 HOURS
Refills: 0 | Status: DISCONTINUED | OUTPATIENT
Start: 2024-03-27 | End: 2024-03-27

## 2024-03-27 RX ORDER — HYDROMORPHONE HYDROCHLORIDE 2 MG/ML
0.5 INJECTION INTRAMUSCULAR; INTRAVENOUS; SUBCUTANEOUS
Refills: 0 | Status: DISCONTINUED | OUTPATIENT
Start: 2024-03-27 | End: 2024-03-27

## 2024-03-27 RX ORDER — AMLODIPINE BESYLATE 2.5 MG/1
1 TABLET ORAL
Refills: 0 | DISCHARGE

## 2024-03-27 RX ORDER — HYDROMORPHONE HYDROCHLORIDE 2 MG/ML
1 INJECTION INTRAMUSCULAR; INTRAVENOUS; SUBCUTANEOUS
Refills: 0 | Status: DISCONTINUED | OUTPATIENT
Start: 2024-03-27 | End: 2024-03-27

## 2024-03-27 RX ORDER — OXYCODONE AND ACETAMINOPHEN 5; 325 MG/1; MG/1
1 TABLET ORAL ONCE
Refills: 0 | Status: DISCONTINUED | OUTPATIENT
Start: 2024-03-27 | End: 2024-03-27

## 2024-03-27 RX ORDER — CHLORTHALIDONE 50 MG
1 TABLET ORAL
Refills: 0 | DISCHARGE

## 2024-03-27 RX ORDER — ONDANSETRON 8 MG/1
4 TABLET, FILM COATED ORAL ONCE
Refills: 0 | Status: DISCONTINUED | OUTPATIENT
Start: 2024-03-27 | End: 2024-03-27

## 2024-03-27 RX ORDER — OLMESARTAN MEDOXOMIL 5 MG/1
1 TABLET, FILM COATED ORAL
Refills: 0 | DISCHARGE

## 2024-03-27 RX ORDER — HYDRALAZINE HCL 50 MG
1 TABLET ORAL
Refills: 0 | DISCHARGE

## 2024-03-27 RX ORDER — SODIUM CHLORIDE 9 MG/ML
1000 INJECTION, SOLUTION INTRAVENOUS
Refills: 0 | Status: DISCONTINUED | OUTPATIENT
Start: 2024-03-27 | End: 2024-03-27

## 2024-03-27 RX ORDER — LABETALOL HCL 100 MG
1 TABLET ORAL
Refills: 0 | DISCHARGE

## 2024-03-27 RX ORDER — OXYCODONE HYDROCHLORIDE 5 MG/1
1 TABLET ORAL
Qty: 4 | Refills: 0
Start: 2024-03-27

## 2024-03-27 RX ORDER — ERGOCALCIFEROL 1.25 MG/1
1 CAPSULE ORAL
Refills: 0 | DISCHARGE

## 2024-03-27 RX ORDER — ASPIRIN/CALCIUM CARB/MAGNESIUM 324 MG
1 TABLET ORAL
Refills: 0 | DISCHARGE

## 2024-03-27 NOTE — ASU PATIENT PROFILE, ADULT - FALL HARM RISK - UNIVERSAL INTERVENTIONS
Bed in lowest position, wheels locked, appropriate side rails in place/Call bell, personal items and telephone in reach/Instruct patient to call for assistance before getting out of bed or chair/Non-slip footwear when patient is out of bed/Traer to call system/Physically safe environment - no spills, clutter or unnecessary equipment/Purposeful Proactive Rounding/Room/bathroom lighting operational, light cord in reach

## 2024-03-27 NOTE — ASU PATIENT PROFILE, ADULT - NSICDXPASTMEDICALHX_GEN_ALL_CORE_FT
PAST MEDICAL HISTORY:  Chronic kidney disease (CKD)     HTN (hypertension)     Mass of right breast     Prediabetes     Respiratory disorder

## 2024-03-27 NOTE — ASU DISCHARGE PLAN (ADULT/PEDIATRIC) - CARE PROVIDER_API CALL
Cayetano Hobbs  Surgery  [address] [facility], [address]  [place] [address]  Phone: [phone]  Fax: [phone]  Follow Up Time: 2 weeks

## 2024-03-27 NOTE — BRIEF OPERATIVE NOTE - NSICDXBRIEFPROCEDURE_GEN_ALL_CORE_FT
PROCEDURES:  Lumpectomy or partial mastectomy, after needle localization 27-Mar-2024 14:55:54 right Jessica Corbett

## 2024-04-01 PROBLEM — R73.03 PREDIABETES: Chronic | Status: ACTIVE | Noted: 2024-03-21

## 2024-04-01 PROBLEM — N18.9 CHRONIC KIDNEY DISEASE, UNSPECIFIED: Chronic | Status: ACTIVE | Noted: 2024-03-21

## 2024-04-01 PROBLEM — J98.9 RESPIRATORY DISORDER, UNSPECIFIED: Chronic | Status: ACTIVE | Noted: 2024-03-21

## 2024-04-02 ENCOUNTER — NON-APPOINTMENT (OUTPATIENT)
Age: 74
End: 2024-04-02

## 2024-04-09 LAB — SURGICAL PATHOLOGY STUDY: SIGNIFICANT CHANGE UP

## 2024-04-11 ENCOUNTER — APPOINTMENT (OUTPATIENT)
Dept: SURGERY | Facility: CLINIC | Age: 74
End: 2024-04-11
Payer: MEDICARE

## 2024-04-11 PROCEDURE — 99024 POSTOP FOLLOW-UP VISIT: CPT

## 2024-04-11 NOTE — DATA REVIEWED
[FreeTextEntry1] : Roseann Accession Number : [id number] Patient:     ARCHIE BRADFORD Accession:                             [id number]  Collected Date/Time:                   3/27/2024 14:40 EDT Received Date/Time:                    3/27/2024 14:50 EDT  Surgical Pathology Report - Auth (Verified)  Specimen(s) Submitted 1  Right Breast Mass: short-superior, long-lateral  Final Diagnosis Mass, right breast; excision with preoperative needle localization: -   Benign breast tissue with changes consistent with previous surgical intervention. -   Intraductal papilloma with usual and micropapillary epithelial  hyperplasia. -   Fibrocystic change harboring microcalcifications and including usual, papillary and micropapillary ductal epithelial hyperplasia, columnar cell hyperplasia, sclerosing adenosis, stromal fibrosis and cysts. Verified by: Lacy Simental MD (Electronic Signature) Reported on: 04/09/24 13:21 EDT, [address] Phone: [phone]   Fax: [phone] ___________________________________________________

## 2024-04-11 NOTE — PHYSICAL EXAM
[Alert] : alert [Oriented to Person] : oriented to person [Oriented to Place] : oriented to place [Oriented to Time] : oriented to time [Calm] : calm [de-identified] : She  is alert, well-groomed, and in NAD\par    [de-identified] : anicteric.  Nasal mucosa pink, septum midline. Oral mucosa pink.  Tongue midline, Pharynx without exudates.\par    [de-identified] : Neck supple. Trachea midline. Thyroid isthmus barely palpable, lobes not felt.\par    [de-identified] : right breast  Surgical wound is healing well.   no signs of  inflammation or infection.

## 2024-04-11 NOTE — HISTORY OF PRESENT ILLNESS
[de-identified] : Ms. BRADFORD  is s/p excision right breast mass with spot localization on 2024. Patient's pathology results were  consistent with  Intraductal papilloma with usual and micropapillary epithelial hyperplasia.  Today  Ms. BRADFORD offers no complaints. patient reports no fever, chills,  or  pain.  Her surgical wound is healing well. No signs of inflammation, infection or exudate.   Patient reports good bowel movements and appetite.   PERTINENT HISTORY: There is family history of breast carcinoma in her mother in her 60s and her daughter at age 48 ( she had chemo and RT. genetic testing was negative). Menarche at age 12 -7 para-7 and her last menstrual period was before her hysterectomy at age 49. Patient had a BL breast US and a mammogram on 05/15/2023 that was deemed a BIRADS 0.She had a targeted right breast mammogram on 2023 that was deemed a BIRADS4. A stereotactic biopsy right breast was done on 2023. Pathology results were consistent with Breast tissue with fibrocystic changes with calcium oxalate crystals. Hemangioma. Incidental small intraductal papilloma. concordance is not documented. Ms. BRADFORD is s/p excision right breast mass with spot localization on 2023. Patient's pathology results were consistent with Fibrocystic changes, proliferative type, without atypia, including usual duct epithelial hyperplasia and small duct papillomas. intraductal calcification associated with hyperplasia.  Patient had a  right breast   Mammogram on 2024. .  Deemed BIRADS category  4.   Ms. BRADFORD  is s/p BL breast US on 2024  that was deemed BIRADS 2.  Ms. BRADFORD  is s/p Stereotactic biopsy right breast on 2024. Patient's pathology results were  consistent with columnar cell change with associated calcifications. Dilated ducts with prominent infoldings. A portion of dilated ducts with prominent frond-like infoldings is noted in one of the cores. This may cause partial luminal obstruction with a subsequent duct dilation. Intraductal papilloma cannot entirely be excluded. Surgical consultation is recommended.

## 2024-05-14 ENCOUNTER — NON-APPOINTMENT (OUTPATIENT)
Age: 74
End: 2024-05-14

## 2024-05-15 DIAGNOSIS — N63.10 UNSPECIFIED LUMP IN THE RIGHT BREAST, UNSPECIFIED QUADRANT: ICD-10-CM

## 2024-05-30 ENCOUNTER — APPOINTMENT (OUTPATIENT)
Dept: SURGERY | Facility: CLINIC | Age: 74
End: 2024-05-30
Payer: MEDICARE

## 2024-05-30 VITALS
HEIGHT: 59 IN | OXYGEN SATURATION: 96 % | HEART RATE: 60 BPM | SYSTOLIC BLOOD PRESSURE: 136 MMHG | WEIGHT: 190 LBS | DIASTOLIC BLOOD PRESSURE: 76 MMHG | BODY MASS INDEX: 38.3 KG/M2

## 2024-05-30 DIAGNOSIS — Z12.39 ENCOUNTER FOR OTHER SCREENING FOR MALIGNANT NEOPLASM OF BREAST: ICD-10-CM

## 2024-05-30 PROCEDURE — 99213 OFFICE O/P EST LOW 20 MIN: CPT | Mod: 24

## 2024-05-30 NOTE — HISTORY OF PRESENT ILLNESS
[de-identified] : This is a 73 year  old patient who was referred by Dr. Mele Cota with the chief complaint of having  left inner thigh mass.  She reports having this condition for few years . She denies any trauma to the area.   She denies any fever or  night sweats. Appetite is good and weight is stable.  She states that recently the mass started to  get  bigger and  more symptomatic. She wants to know if it could  be surgically  removed.  PERTINENT HISTORY: There is family history of breast carcinoma in her mother in her 60s and her daughter at age 48 ( she had chemo and RT. genetic testing was negative). Menarche at age 12 -7 para-7 and her last menstrual period was before her hysterectomy at age 49. Patient had a BL breast US and a mammogram on 05/15/2023 that was deemed a BIRADS 0.She had a targeted right breast mammogram on 2023 that was deemed a BIRADS4. A stereotactic biopsy right breast was done on 2023. Pathology results were consistent with Breast tissue with fibrocystic changes with calcium oxalate crystals. Hemangioma. Incidental small intraductal papilloma. concordance is not documented. Ms. BRADFORD is s/p excision right breast mass with spot localization on 2023. Patient's pathology results were consistent with Fibrocystic changes, proliferative type, without atypia, including usual duct epithelial hyperplasia and small duct papillomas. intraductal calcification associated with hyperplasia.  Patient had a  right breast   Mammogram on 2024. .  Deemed BIRADS category  4.   Ms. BRADFORD  is s/p BL breast US on 2024  that was deemed BIRADS 2.  Ms. BRADFORD  is s/p Stereotactic biopsy right breast on 2024. Patient's pathology results were  consistent with columnar cell change with associated calcifications. Dilated ducts with prominent infoldings. A portion of dilated ducts with prominent frond-like infoldings is noted in one of the cores. This may cause partial luminal obstruction with a subsequent duct dilation. Intraductal papilloma cannot entirely be excluded. Surgical consultation is recommended.  Ms. BRADFORD  is s/p excision right breast mass with spot localization on 2024. Patient's pathology results were  consistent with  Intraductal papilloma with usual and micropapillary epithelial hyperplasia.

## 2024-05-30 NOTE — ASSESSMENT
[FreeTextEntry1] : Impression: right breast mass   Ms. BRADFORD is doing well, with excellent post-operative recovery. All surgical incisions  healed well and as expected. There is no evidence of  complication or recurrence, and she is progressing as expected.       2.- l   left inner thigh fat deposit, no clear palpable masses . No palpable lymph nodes.  - no surgery recommended Patient was strongly  advised to lose weight. I reviewed importance of behavioral modification. Nutrition was reviewed and reinforced, including the importance of making healthy food choices. The importance of maintaining regular exercise/physical activity also reinforced. Recommend patient to see nutritionist. Patient's questions and concerns addressed to patient's satisfaction.

## 2024-05-30 NOTE — PLAN
[FreeTextEntry1] : Ms. BRADFORD  is presenting  today for an evaluation .  she is doing well and offers no complaints.  Results of  her last   imaging and physical examination findings were discussed in details.  Significance of the findings were discussed.    She  was advised to have  BL    breast US and Mammogram in  August and return after the tests. Importance of monthly self-breast examination was reinforced.  Patient's questions and concerns addressed to patient's satisfaction.

## 2024-05-30 NOTE — PHYSICAL EXAM
[Alert] : alert [Oriented to Person] : oriented to person [Oriented to Place] : oriented to place [Oriented to Time] : oriented to time [Calm] : calm [de-identified] : She  is alert, well-groomed, and in NAD\par    [de-identified] : anicteric.  Nasal mucosa pink, septum midline. Oral mucosa pink.  Tongue midline, Pharynx without exudates.\par    [de-identified] : Neck supple. Trachea midline. Thyroid isthmus barely palpable, lobes not felt.\par    [de-identified] : right breast  Surgical wound  healed  well.   no signs of  inflammation or infection.   [de-identified] :  left inner thigh fat deposit, no clear palpable masses . No palpable lymph nodes.

## 2024-07-15 ENCOUNTER — APPOINTMENT (OUTPATIENT)
Dept: SURGERY | Facility: CLINIC | Age: 74
End: 2024-07-15

## 2024-07-15 DIAGNOSIS — N63.10 UNSPECIFIED LUMP IN THE RIGHT BREAST, UNSPECIFIED QUADRANT: ICD-10-CM

## 2024-09-18 ENCOUNTER — APPOINTMENT (OUTPATIENT)
Dept: COLORECTAL SURGERY | Facility: CLINIC | Age: 74
End: 2024-09-18
Payer: MEDICARE

## 2024-09-18 DIAGNOSIS — R15.9 FULL INCONTINENCE OF FECES: ICD-10-CM

## 2024-09-18 DIAGNOSIS — R15.2 FULL INCONTINENCE OF FECES: ICD-10-CM

## 2024-09-18 PROCEDURE — 99204 OFFICE O/P NEW MOD 45 MIN: CPT | Mod: 25

## 2024-09-18 PROCEDURE — 46600 DIAGNOSTIC ANOSCOPY SPX: CPT

## 2024-09-18 NOTE — ASSESSMENT
[FreeTextEntry1] : We discussed the various options for treatment of fecal incontinence.  Those include stool bulking with fiber and possibly Imodium, biofeedback therapy, sacral nerve stimulator placement, and Solesta injection. She would like to start with fiber supplementation and physical therapy for now and she will follow-up for sacral nerve stimulator placement if the above fails to improve her symptoms

## 2024-09-18 NOTE — HISTORY OF PRESENT ILLNESS
[FreeTextEntry1] :  #[id number]  The patient is evaluated with her daughter She reports a several year history of progressively worsening fecal urgency and incontinence.  It is very difficult to discern the frequency and quality of the episodes as she is unable to give me these answers directly but it sounds like she has urgency on a daily basis and has bowel movements about 2-3 times a day that are soft and not well-formed.  She does not always have an incontinence episode but she always makes sure to be close to a bathroom.  She will have an actual accident about once every 2 weeks or so.  Her last colonoscopy was last month.  She denies any urinary incontinence   x7

## 2024-09-18 NOTE — PHYSICAL EXAM
[Excoriation] : no perianal excoriation [Fistula] : no fistulas [Wart] : no warts [Ulcer ___ cm] : no ulcers [None] : there was no rectal mass  [Normal Breath Sounds] : Normal breath sounds [Wheezing] : no wheezing was heard [Normal Heart Sounds] : normal heart sounds [Normal Rate and Rhythm] : normal rate and rhythm [Alert] : alert [Oriented to Person] : oriented to person [Oriented to Place] : oriented to place [Oriented to Time] : oriented to time [Calm] : calm [de-identified] : soft, NT/ND [de-identified] : Small external hemorrhoids [de-identified] : Diminished resting tone, poor squeeze bilateral levators.  Anoscopy reveals no proctitis but a copious amount of ill formed stool in the rectal vault [de-identified] : NAD [de-identified] : NCAT [de-identified] : supple [de-identified] : JOVANY [de-identified] : warm

## 2024-09-18 NOTE — CONSULT LETTER
[Dear  ___] : Dear  [unfilled], [Courtesy Letter:] : I had the pleasure of seeing your patient, [unfilled], in my office today. [Please see my note below.] : Please see my note below. [Sincerely,] : Sincerely, [FreeTextEntry3] : Tommy Snowden MD, FACS, FASCRS Colorectal Surgery [facility] Assistant Professor of Surgery [employer] at [address], [address] [place] [address] Tel: [phone]  Cell: [phone]  Email: [email or web address]

## 2024-09-26 ENCOUNTER — APPOINTMENT (OUTPATIENT)
Dept: SURGERY | Facility: CLINIC | Age: 74
End: 2024-09-26
Payer: MEDICARE

## 2024-09-26 ENCOUNTER — NON-APPOINTMENT (OUTPATIENT)
Age: 74
End: 2024-09-26

## 2024-09-26 VITALS
WEIGHT: 199 LBS | BODY MASS INDEX: 40.12 KG/M2 | HEART RATE: 64 BPM | DIASTOLIC BLOOD PRESSURE: 81 MMHG | HEIGHT: 59 IN | SYSTOLIC BLOOD PRESSURE: 164 MMHG | OXYGEN SATURATION: 99 %

## 2024-09-26 DIAGNOSIS — N63.10 UNSPECIFIED LUMP IN THE RIGHT BREAST, UNSPECIFIED QUADRANT: ICD-10-CM

## 2024-09-26 PROCEDURE — 99213 OFFICE O/P EST LOW 20 MIN: CPT

## 2024-09-26 NOTE — ASSESSMENT
[FreeTextEntry1] : Impression: right breast mass   Ms. BRADFORD is doing well, with excellent post-operative recovery. All surgical incisions  healed well and as expected. There is no evidence of  complication or recurrence, and she is progressing as expected.

## 2024-09-26 NOTE — HISTORY OF PRESENT ILLNESS
[de-identified] :    This is  a 74 year   old patient presenting today for a breast exam and to discuss the results of her most recent breast imaging. Patient had a BL breast US and   BL breast Mammogram on 2024 that was deemed BIRADS category 2 .  Ms. BRADFORD denies any trauma to the breast, denies  skin changes  and   she has no spontaneous nipple discharge. Patient denies any fever, night sweats or loss of appetite.     There is  family history of breast carcinoma in her sister.   Patient is on no hormonal replacement therapy   PERTINENT HISTORY: There is family history of breast carcinoma in her mother in her 60s and her daughter at age 48 ( she had chemo and RT. genetic testing was negative). Menarche at age 12 -7 para-7 and her last menstrual period was before her hysterectomy at age 49. Patient had a BL breast US and a mammogram on 05/15/2023 that was deemed a BIRADS 0.She had a targeted right breast mammogram on 2023 that was deemed a BIRADS4. A stereotactic biopsy right breast was done on 2023. Pathology results were consistent with Breast tissue with fibrocystic changes with calcium oxalate crystals. Hemangioma. Incidental small intraductal papilloma. concordance is not documented. Ms. BRADFORD is s/p excision right breast mass with spot localization on 2023. Patient's pathology results were consistent with Fibrocystic changes, proliferative type, without atypia, including usual duct epithelial hyperplasia and small duct papillomas. intraductal calcification associated with hyperplasia.  Patient had a  right breast   Mammogram on 2024. .  Deemed BIRADS category  4.   Ms. BRADFORD  is s/p BL breast US on 2024  that was deemed BIRADS 2.  Ms. BRADFORD  is s/p Stereotactic biopsy right breast on 2024. Patient's pathology results were  consistent with columnar cell change with associated calcifications. Dilated ducts with prominent infoldings. A portion of dilated ducts with prominent frond-like infoldings is noted in one of the cores. This may cause partial luminal obstruction with a subsequent duct dilation. Intraductal papilloma cannot entirely be excluded. Surgical consultation is recommended.    Ms. BRADFORD  is s/p excision right breast mass with spot localization on 2024. Patient's pathology results were  consistent with  Intraductal papilloma with usual and micropapillary epithelial hyperplasia.    [de-identified] : .

## 2024-09-26 NOTE — PLAN
[FreeTextEntry1] : Ms. BRADFORD  is presenting  today for an evaluation .  she is doing well and offers no complaints.  Results of  her last   imaging and physical examination findings were discussed in details.  Significance of the findings were discussed.    She  was advised to  return in 6 months for exam  Importance of monthly self-breast examination was reinforced.  Patient's questions and concerns addressed to patient's satisfaction.

## 2024-09-26 NOTE — PHYSICAL EXAM
[Alert] : alert [Oriented to Person] : oriented to person [Oriented to Place] : oriented to place [Oriented to Time] : oriented to time [Calm] : calm [de-identified] : She  is alert, well-groomed, and in NAD\par    [de-identified] : anicteric.  Nasal mucosa pink, septum midline. Oral mucosa pink.  Tongue midline, Pharynx without exudates.\par    [de-identified] : Neck supple. Trachea midline. Thyroid isthmus barely palpable, lobes not felt.\par    [de-identified] :    Breast are symmetric,  No palpable nodules, masses, tenderness, or axillary or supraclavicular adenopathy. No nipple discharge. no skin retraction     [de-identified] :  left inner thigh fat deposit, no clear palpable masses . No palpable lymph nodes.

## 2024-09-26 NOTE — PHYSICAL EXAM
[Alert] : alert [Oriented to Person] : oriented to person [Oriented to Place] : oriented to place [Oriented to Time] : oriented to time [Calm] : calm [de-identified] : She  is alert, well-groomed, and in NAD\par    [de-identified] : anicteric.  Nasal mucosa pink, septum midline. Oral mucosa pink.  Tongue midline, Pharynx without exudates.\par    [de-identified] : Neck supple. Trachea midline. Thyroid isthmus barely palpable, lobes not felt.\par    [de-identified] :    Breast are symmetric,  No palpable nodules, masses, tenderness, or axillary or supraclavicular adenopathy. No nipple discharge. no skin retraction     [de-identified] :  left inner thigh fat deposit, no clear palpable masses . No palpable lymph nodes.

## 2024-09-26 NOTE — HISTORY OF PRESENT ILLNESS
[de-identified] :    This is  a 74 year   old patient presenting today for a breast exam and to discuss the results of her most recent breast imaging. Patient had a BL breast US and   BL breast Mammogram on 2024 that was deemed BIRADS category 2 .  Ms. BRADFORD denies any trauma to the breast, denies  skin changes  and   she has no spontaneous nipple discharge. Patient denies any fever, night sweats or loss of appetite.     There is  family history of breast carcinoma in her sister.   Patient is on no hormonal replacement therapy   PERTINENT HISTORY: There is family history of breast carcinoma in her mother in her 60s and her daughter at age 48 ( she had chemo and RT. genetic testing was negative). Menarche at age 12 -7 para-7 and her last menstrual period was before her hysterectomy at age 49. Patient had a BL breast US and a mammogram on 05/15/2023 that was deemed a BIRADS 0.She had a targeted right breast mammogram on 2023 that was deemed a BIRADS4. A stereotactic biopsy right breast was done on 2023. Pathology results were consistent with Breast tissue with fibrocystic changes with calcium oxalate crystals. Hemangioma. Incidental small intraductal papilloma. concordance is not documented. Ms. BRADFORD is s/p excision right breast mass with spot localization on 2023. Patient's pathology results were consistent with Fibrocystic changes, proliferative type, without atypia, including usual duct epithelial hyperplasia and small duct papillomas. intraductal calcification associated with hyperplasia.  Patient had a  right breast   Mammogram on 2024. .  Deemed BIRADS category  4.   Ms. BRADFORD  is s/p BL breast US on 2024  that was deemed BIRADS 2.  Ms. BRADFORD  is s/p Stereotactic biopsy right breast on 2024. Patient's pathology results were  consistent with columnar cell change with associated calcifications. Dilated ducts with prominent infoldings. A portion of dilated ducts with prominent frond-like infoldings is noted in one of the cores. This may cause partial luminal obstruction with a subsequent duct dilation. Intraductal papilloma cannot entirely be excluded. Surgical consultation is recommended.    Ms. BRADFORD  is s/p excision right breast mass with spot localization on 2024. Patient's pathology results were  consistent with  Intraductal papilloma with usual and micropapillary epithelial hyperplasia.    [de-identified] : .

## 2024-09-26 NOTE — ASSESSMENT
Chief Complaint   Patient presents with     Urgent Care     URI     bad coughing, runny nose. was seen 1 week ago, not getting better. sick about 1 1/2 weeks. sister just diagnosed with strep.        Initial Pulse 112  Temp 98.5  F (36.9  C) (Axillary)  Resp 24  Wt 29 lb (13.2 kg)  SpO2 96% There is no height or weight on file to calculate BMI.  Medication Reconciliation: complete   [FreeTextEntry1] : Impression: right breast mass   Ms. BRADFORD is doing well, with excellent post-operative recovery. All surgical incisions  healed well and as expected. There is no evidence of  complication or recurrence, and she is progressing as expected.

## 2025-03-17 ENCOUNTER — APPOINTMENT (OUTPATIENT)
Dept: SURGERY | Facility: CLINIC | Age: 75
End: 2025-03-17
Payer: MEDICARE

## 2025-03-17 ENCOUNTER — NON-APPOINTMENT (OUTPATIENT)
Age: 75
End: 2025-03-17

## 2025-03-17 VITALS
OXYGEN SATURATION: 96 % | HEART RATE: 67 BPM | DIASTOLIC BLOOD PRESSURE: 79 MMHG | WEIGHT: 195 LBS | SYSTOLIC BLOOD PRESSURE: 140 MMHG | BODY MASS INDEX: 39.31 KG/M2 | HEIGHT: 59 IN

## 2025-03-17 DIAGNOSIS — N63.10 UNSPECIFIED LUMP IN THE RIGHT BREAST, UNSPECIFIED QUADRANT: ICD-10-CM

## 2025-03-17 PROCEDURE — 99213 OFFICE O/P EST LOW 20 MIN: CPT

## (undated) DEVICE — GOWN XL

## (undated) DEVICE — SYR LUER LOK 10CC

## (undated) DEVICE — PREP BETADINE SPONGE STICKS

## (undated) DEVICE — GLV 7 PROTEXIS (WHITE)

## (undated) DEVICE — WARMING BLANKET LOWER ADULT

## (undated) DEVICE — DRAPE LAPAROTOMY TRANSVERSE

## (undated) DEVICE — SUT POLYSORB 4-0 27" P-12 UNDYED

## (undated) DEVICE — NDL HYPO REGULAR BEVEL 25G X 1.5" (BLUE)

## (undated) DEVICE — SOL IRR POUR H2O 1500ML

## (undated) DEVICE — DRSG TEGADERM 4X4.75"

## (undated) DEVICE — VENODYNE/SCD SLEEVE CALF MEDIUM

## (undated) DEVICE — DRAPE LIGHT HANDLE COVER (BLUE)

## (undated) DEVICE — SUT SOFSILK 2-0 30" V-20

## (undated) DEVICE — GLV 7.5 PROTEXIS (WHITE)

## (undated) DEVICE — NDL HYPO SAFE 25G X 1.5" (ORANGE)

## (undated) DEVICE — ELCTR GROUNDING PAD ADULT COVIDIEN

## (undated) DEVICE — PACK MINOR NO DRAPE

## (undated) DEVICE — FOR-ESU VALLEYLAB T7E15008DX: Type: DURABLE MEDICAL EQUIPMENT

## (undated) DEVICE — SOL IRR POUR H2O 500ML

## (undated) DEVICE — DRSG CURITY GAUZE SPONGE 4 X 4" 12-PLY

## (undated) DEVICE — SUT POLYSORB 3-0 30" V-20 UNDYED

## (undated) DEVICE — FOR-ESU VALLEYLAB T7E15009DX: Type: DURABLE MEDICAL EQUIPMENT

## (undated) DEVICE — PREP CHLORAPREP HI-LITE ORANGE 26ML

## (undated) DEVICE — DRAPE 1/2 SHEET 40X57"

## (undated) DEVICE — DRSG MASTISOL